# Patient Record
Sex: FEMALE
[De-identification: names, ages, dates, MRNs, and addresses within clinical notes are randomized per-mention and may not be internally consistent; named-entity substitution may affect disease eponyms.]

---

## 2020-11-17 ENCOUNTER — HOSPITAL ENCOUNTER (OUTPATIENT)
Dept: HOSPITAL 53 - M WUC | Age: 20
End: 2020-11-17
Attending: PHYSICIAN ASSISTANT
Payer: SELF-PAY

## 2020-11-17 DIAGNOSIS — R30.0: Primary | ICD-10-CM

## 2021-01-07 ENCOUNTER — HOSPITAL ENCOUNTER (EMERGENCY)
Dept: HOSPITAL 53 - M ED | Age: 21
Discharge: LEFT BEFORE DISCHARGE | End: 2021-01-07
Payer: SELF-PAY

## 2021-01-07 VITALS — WEIGHT: 155.32 LBS | BODY MASS INDEX: 24.38 KG/M2 | HEIGHT: 67 IN

## 2021-01-07 VITALS — DIASTOLIC BLOOD PRESSURE: 71 MMHG | SYSTOLIC BLOOD PRESSURE: 136 MMHG

## 2021-01-07 DIAGNOSIS — Z53.21: Primary | ICD-10-CM

## 2021-09-17 ENCOUNTER — HOSPITAL ENCOUNTER (OUTPATIENT)
Dept: HOSPITAL 53 - M RAD | Age: 21
End: 2021-09-17
Attending: ADVANCED PRACTICE MIDWIFE
Payer: COMMERCIAL

## 2021-09-17 DIAGNOSIS — Z34.92: Primary | ICD-10-CM

## 2021-10-07 ENCOUNTER — HOSPITAL ENCOUNTER (OUTPATIENT)
Dept: HOSPITAL 53 - M RAD | Age: 21
End: 2021-10-07
Attending: NURSE PRACTITIONER
Payer: COMMERCIAL

## 2021-10-07 DIAGNOSIS — Z36.89: Primary | ICD-10-CM

## 2021-10-07 DIAGNOSIS — Z3A.22: ICD-10-CM

## 2021-10-07 NOTE — REP
INDICATION:

PREG 22+ FETAL ANATOMY



COMPARISON:

09/17/2021



TECHNIQUE:

Transabdominal obstetrical ultrasound with color Doppler evaluation.



FINDINGS:

Examination demonstrates a single live intrauterine pregnancy in cephalic

presentation.  Fetal motion is identified by technologist.  Placenta is noted anterior

and  grade 0 without evidence for placenta previa or abruption.  Amniotic fluid volume

is normal.  Cervix measures 3.5 cm in length and appears closed..



Selected gestational age: 23 weeks 4 days with GABY 01/30/2022.

Gestational age by current measurements 23 weeks 1 day with GABY 02/02/2022.



FHR equals 150 beats per minute.



Estimated fetal weight 587 grams (47thpercentile).



Anatomical assessment demonstrates normal structures including facial profile,

four-chamber heart, diaphragm, stomach/abdominal wall, kidneys/bladder and spine.



IMPRESSION:

Single live intrauterine pregnancy in cephalic presentation demonstrating appropriate

estimated fetal weight.  In conjunction with prior examination anatomical assessment

is complete and normal.  No gross abnormalities are identified.





<Electronically signed by César Quiñones > 10/07/21 1967

## 2021-11-23 ENCOUNTER — HOSPITAL ENCOUNTER (EMERGENCY)
Dept: HOSPITAL 53 - M ED | Age: 21
Discharge: LEFT BEFORE DISCHARGE | End: 2021-11-23
Payer: COMMERCIAL

## 2021-11-23 VITALS
SYSTOLIC BLOOD PRESSURE: 109 MMHG | HEIGHT: 67 IN | BODY MASS INDEX: 25.36 KG/M2 | WEIGHT: 161.6 LBS | DIASTOLIC BLOOD PRESSURE: 62 MMHG

## 2021-11-23 DIAGNOSIS — Z53.21: Primary | ICD-10-CM

## 2022-01-25 ENCOUNTER — HOSPITAL ENCOUNTER (OUTPATIENT)
Dept: HOSPITAL 53 - M LDO | Age: 22
Discharge: HOME | End: 2022-01-25
Attending: OBSTETRICS & GYNECOLOGY
Payer: COMMERCIAL

## 2022-01-25 VITALS — BODY MASS INDEX: 27.44 KG/M2 | WEIGHT: 174.83 LBS | HEIGHT: 67 IN

## 2022-01-25 VITALS — DIASTOLIC BLOOD PRESSURE: 81 MMHG | SYSTOLIC BLOOD PRESSURE: 134 MMHG

## 2022-01-25 DIAGNOSIS — O36.8130: Primary | ICD-10-CM

## 2022-01-25 DIAGNOSIS — G43.909: ICD-10-CM

## 2022-01-25 DIAGNOSIS — O26.893: ICD-10-CM

## 2022-01-25 DIAGNOSIS — O99.353: ICD-10-CM

## 2022-01-25 DIAGNOSIS — Z3A.39: ICD-10-CM

## 2022-01-25 PROCEDURE — 84460 ALANINE AMINO (ALT) (SGPT): CPT

## 2022-01-25 PROCEDURE — 84156 ASSAY OF PROTEIN URINE: CPT

## 2022-01-25 PROCEDURE — 82247 BILIRUBIN TOTAL: CPT

## 2022-01-25 PROCEDURE — 59025 FETAL NON-STRESS TEST: CPT

## 2022-01-25 PROCEDURE — 85027 COMPLETE CBC AUTOMATED: CPT

## 2022-01-25 PROCEDURE — 82565 ASSAY OF CREATININE: CPT

## 2022-01-25 PROCEDURE — 84550 ASSAY OF BLOOD/URIC ACID: CPT

## 2022-01-25 PROCEDURE — 82570 ASSAY OF URINE CREATININE: CPT

## 2022-01-25 PROCEDURE — 84450 TRANSFERASE (AST) (SGOT): CPT

## 2022-01-25 PROCEDURE — 36415 COLL VENOUS BLD VENIPUNCTURE: CPT

## 2022-01-25 PROCEDURE — 83615 LACTATE (LD) (LDH) ENZYME: CPT

## 2022-01-28 ENCOUNTER — HOSPITAL ENCOUNTER (OUTPATIENT)
Dept: HOSPITAL 53 - M LDO | Age: 22
Discharge: HOME | End: 2022-01-28
Attending: STUDENT IN AN ORGANIZED HEALTH CARE EDUCATION/TRAINING PROGRAM
Payer: COMMERCIAL

## 2022-01-28 VITALS — BODY MASS INDEX: 27.54 KG/M2 | HEIGHT: 67 IN | WEIGHT: 175.49 LBS

## 2022-01-28 DIAGNOSIS — Z3A.39: ICD-10-CM

## 2022-01-28 DIAGNOSIS — O47.1: Primary | ICD-10-CM

## 2022-01-28 PROCEDURE — 59025 FETAL NON-STRESS TEST: CPT

## 2022-01-31 ENCOUNTER — HOSPITAL ENCOUNTER (OUTPATIENT)
Dept: HOSPITAL 53 - M LDO | Age: 22
Discharge: HOME | End: 2022-01-31
Attending: OBSTETRICS & GYNECOLOGY
Payer: COMMERCIAL

## 2022-01-31 VITALS — DIASTOLIC BLOOD PRESSURE: 73 MMHG | SYSTOLIC BLOOD PRESSURE: 113 MMHG

## 2022-01-31 VITALS — WEIGHT: 175.71 LBS | BODY MASS INDEX: 27.58 KG/M2 | HEIGHT: 67 IN

## 2022-01-31 DIAGNOSIS — O26.893: Primary | ICD-10-CM

## 2022-01-31 DIAGNOSIS — Z3A.40: ICD-10-CM

## 2022-01-31 DIAGNOSIS — N89.8: ICD-10-CM

## 2022-01-31 DIAGNOSIS — O48.0: ICD-10-CM

## 2022-01-31 PROCEDURE — 59025 FETAL NON-STRESS TEST: CPT

## 2022-01-31 PROCEDURE — 76815 OB US LIMITED FETUS(S): CPT

## 2022-02-01 ENCOUNTER — HOSPITAL ENCOUNTER (INPATIENT)
Dept: HOSPITAL 53 - M LDO | Age: 22
LOS: 2 days | Discharge: HOME | End: 2022-02-03
Attending: REGISTERED NURSE | Admitting: OBSTETRICS & GYNECOLOGY
Payer: COMMERCIAL

## 2022-02-01 VITALS — DIASTOLIC BLOOD PRESSURE: 60 MMHG | SYSTOLIC BLOOD PRESSURE: 133 MMHG

## 2022-02-01 VITALS — DIASTOLIC BLOOD PRESSURE: 52 MMHG | SYSTOLIC BLOOD PRESSURE: 104 MMHG

## 2022-02-01 VITALS — DIASTOLIC BLOOD PRESSURE: 66 MMHG | SYSTOLIC BLOOD PRESSURE: 123 MMHG

## 2022-02-01 VITALS — BODY MASS INDEX: 27.58 KG/M2 | HEIGHT: 67 IN | WEIGHT: 175.71 LBS

## 2022-02-01 VITALS — DIASTOLIC BLOOD PRESSURE: 51 MMHG | SYSTOLIC BLOOD PRESSURE: 101 MMHG

## 2022-02-01 VITALS — DIASTOLIC BLOOD PRESSURE: 62 MMHG | SYSTOLIC BLOOD PRESSURE: 127 MMHG

## 2022-02-01 VITALS — DIASTOLIC BLOOD PRESSURE: 70 MMHG | SYSTOLIC BLOOD PRESSURE: 134 MMHG

## 2022-02-01 VITALS — SYSTOLIC BLOOD PRESSURE: 131 MMHG | DIASTOLIC BLOOD PRESSURE: 60 MMHG

## 2022-02-01 VITALS — SYSTOLIC BLOOD PRESSURE: 130 MMHG | DIASTOLIC BLOOD PRESSURE: 60 MMHG

## 2022-02-01 VITALS — DIASTOLIC BLOOD PRESSURE: 55 MMHG | SYSTOLIC BLOOD PRESSURE: 111 MMHG

## 2022-02-01 VITALS — DIASTOLIC BLOOD PRESSURE: 60 MMHG | SYSTOLIC BLOOD PRESSURE: 128 MMHG

## 2022-02-01 VITALS — DIASTOLIC BLOOD PRESSURE: 68 MMHG | SYSTOLIC BLOOD PRESSURE: 127 MMHG

## 2022-02-01 VITALS — DIASTOLIC BLOOD PRESSURE: 70 MMHG | SYSTOLIC BLOOD PRESSURE: 142 MMHG

## 2022-02-01 VITALS — SYSTOLIC BLOOD PRESSURE: 111 MMHG | DIASTOLIC BLOOD PRESSURE: 55 MMHG

## 2022-02-01 VITALS — SYSTOLIC BLOOD PRESSURE: 118 MMHG | DIASTOLIC BLOOD PRESSURE: 58 MMHG

## 2022-02-01 VITALS — DIASTOLIC BLOOD PRESSURE: 61 MMHG | SYSTOLIC BLOOD PRESSURE: 129 MMHG

## 2022-02-01 VITALS — SYSTOLIC BLOOD PRESSURE: 124 MMHG | DIASTOLIC BLOOD PRESSURE: 68 MMHG

## 2022-02-01 VITALS — DIASTOLIC BLOOD PRESSURE: 78 MMHG | SYSTOLIC BLOOD PRESSURE: 144 MMHG

## 2022-02-01 VITALS — SYSTOLIC BLOOD PRESSURE: 135 MMHG | DIASTOLIC BLOOD PRESSURE: 64 MMHG

## 2022-02-01 VITALS — DIASTOLIC BLOOD PRESSURE: 82 MMHG | SYSTOLIC BLOOD PRESSURE: 135 MMHG

## 2022-02-01 VITALS — SYSTOLIC BLOOD PRESSURE: 120 MMHG | DIASTOLIC BLOOD PRESSURE: 58 MMHG

## 2022-02-01 VITALS — DIASTOLIC BLOOD PRESSURE: 65 MMHG | SYSTOLIC BLOOD PRESSURE: 137 MMHG

## 2022-02-01 VITALS — SYSTOLIC BLOOD PRESSURE: 131 MMHG | DIASTOLIC BLOOD PRESSURE: 70 MMHG

## 2022-02-01 VITALS — SYSTOLIC BLOOD PRESSURE: 120 MMHG | DIASTOLIC BLOOD PRESSURE: 71 MMHG

## 2022-02-01 VITALS — DIASTOLIC BLOOD PRESSURE: 70 MMHG | SYSTOLIC BLOOD PRESSURE: 144 MMHG

## 2022-02-01 VITALS — SYSTOLIC BLOOD PRESSURE: 116 MMHG | DIASTOLIC BLOOD PRESSURE: 88 MMHG

## 2022-02-01 VITALS — SYSTOLIC BLOOD PRESSURE: 149 MMHG | DIASTOLIC BLOOD PRESSURE: 74 MMHG

## 2022-02-01 VITALS — DIASTOLIC BLOOD PRESSURE: 57 MMHG | SYSTOLIC BLOOD PRESSURE: 106 MMHG

## 2022-02-01 VITALS — DIASTOLIC BLOOD PRESSURE: 85 MMHG | SYSTOLIC BLOOD PRESSURE: 144 MMHG

## 2022-02-01 VITALS — SYSTOLIC BLOOD PRESSURE: 148 MMHG | DIASTOLIC BLOOD PRESSURE: 85 MMHG

## 2022-02-01 VITALS — SYSTOLIC BLOOD PRESSURE: 138 MMHG | DIASTOLIC BLOOD PRESSURE: 59 MMHG

## 2022-02-01 VITALS — DIASTOLIC BLOOD PRESSURE: 53 MMHG | SYSTOLIC BLOOD PRESSURE: 106 MMHG

## 2022-02-01 VITALS — DIASTOLIC BLOOD PRESSURE: 87 MMHG | SYSTOLIC BLOOD PRESSURE: 149 MMHG

## 2022-02-01 VITALS — SYSTOLIC BLOOD PRESSURE: 110 MMHG | DIASTOLIC BLOOD PRESSURE: 59 MMHG

## 2022-02-01 VITALS — DIASTOLIC BLOOD PRESSURE: 62 MMHG | SYSTOLIC BLOOD PRESSURE: 129 MMHG

## 2022-02-01 VITALS — DIASTOLIC BLOOD PRESSURE: 58 MMHG | SYSTOLIC BLOOD PRESSURE: 113 MMHG

## 2022-02-01 VITALS — DIASTOLIC BLOOD PRESSURE: 64 MMHG | SYSTOLIC BLOOD PRESSURE: 117 MMHG

## 2022-02-01 VITALS — SYSTOLIC BLOOD PRESSURE: 116 MMHG | DIASTOLIC BLOOD PRESSURE: 56 MMHG

## 2022-02-01 DIAGNOSIS — F41.9: ICD-10-CM

## 2022-02-01 DIAGNOSIS — O32.6XX0: ICD-10-CM

## 2022-02-01 DIAGNOSIS — F32.A: ICD-10-CM

## 2022-02-01 DIAGNOSIS — D64.9: ICD-10-CM

## 2022-02-01 DIAGNOSIS — O48.0: Primary | ICD-10-CM

## 2022-02-01 DIAGNOSIS — Z3A.40: ICD-10-CM

## 2022-02-01 LAB
HCT VFR BLD AUTO: 32.1 % (ref 36–47)
HGB BLD-MCNC: 10.8 G/DL (ref 12–15.5)
MCH RBC QN AUTO: 28.3 PG (ref 27–33)
MCHC RBC AUTO-ENTMCNC: 33.6 G/DL (ref 32–36.5)
MCV RBC AUTO: 84 FL (ref 80–96)
PLATELET # BLD AUTO: 379 10^3/UL (ref 150–450)
RBC # BLD AUTO: 3.82 10^6/UL (ref 4–5.4)
WBC # BLD AUTO: 16.5 10^3/UL (ref 4–10)

## 2022-02-01 PROCEDURE — 0HQ9XZZ REPAIR PERINEUM SKIN, EXTERNAL APPROACH: ICD-10-PCS | Performed by: REGISTERED NURSE

## 2022-02-01 RX ADMIN — SODIUM CHLORIDE, POTASSIUM CHLORIDE, SODIUM LACTATE AND CALCIUM CHLORIDE SCH MLS/HR: 600; 310; 30; 20 INJECTION, SOLUTION INTRAVENOUS at 15:02

## 2022-02-01 RX ADMIN — SODIUM CHLORIDE, POTASSIUM CHLORIDE, SODIUM LACTATE AND CALCIUM CHLORIDE SCH MLS/HR: 600; 310; 30; 20 INJECTION, SOLUTION INTRAVENOUS at 08:50

## 2022-02-01 RX ADMIN — ACETAMINOPHEN PRN MG: 325 TABLET ORAL at 22:51

## 2022-02-01 RX ADMIN — IBUPROFEN PRN MG: 800 TABLET, FILM COATED ORAL at 19:32

## 2022-02-02 VITALS — SYSTOLIC BLOOD PRESSURE: 98 MMHG | DIASTOLIC BLOOD PRESSURE: 48 MMHG

## 2022-02-02 VITALS — SYSTOLIC BLOOD PRESSURE: 119 MMHG | DIASTOLIC BLOOD PRESSURE: 72 MMHG

## 2022-02-02 RX ADMIN — DOCUSATE SODIUM PRN MG: 100 CAPSULE, LIQUID FILLED ORAL at 21:45

## 2022-02-02 RX ADMIN — ACETAMINOPHEN PRN MG: 325 TABLET ORAL at 13:09

## 2022-02-02 RX ADMIN — Medication SCH TAB: at 07:32

## 2022-02-02 RX ADMIN — ACETAMINOPHEN PRN MG: 325 TABLET ORAL at 06:09

## 2022-02-02 RX ADMIN — IBUPROFEN PRN MG: 800 TABLET, FILM COATED ORAL at 18:06

## 2022-02-02 RX ADMIN — IBUPROFEN PRN MG: 800 TABLET, FILM COATED ORAL at 07:42

## 2022-02-03 VITALS — DIASTOLIC BLOOD PRESSURE: 55 MMHG | SYSTOLIC BLOOD PRESSURE: 113 MMHG

## 2022-02-03 RX ADMIN — DOCUSATE SODIUM PRN MG: 100 CAPSULE, LIQUID FILLED ORAL at 09:26

## 2022-02-03 RX ADMIN — IBUPROFEN PRN MG: 800 TABLET, FILM COATED ORAL at 09:26

## 2022-02-03 RX ADMIN — Medication SCH TAB: at 09:25

## 2022-07-19 ENCOUNTER — HOSPITAL ENCOUNTER (EMERGENCY)
Dept: HOSPITAL 53 - M ED | Age: 22
Discharge: HOME | End: 2022-07-19
Payer: COMMERCIAL

## 2022-07-19 VITALS — WEIGHT: 149.32 LBS | BODY MASS INDEX: 23.44 KG/M2 | HEIGHT: 67 IN

## 2022-07-19 VITALS — SYSTOLIC BLOOD PRESSURE: 109 MMHG | DIASTOLIC BLOOD PRESSURE: 58 MMHG

## 2022-07-19 DIAGNOSIS — O99.342: ICD-10-CM

## 2022-07-19 DIAGNOSIS — J06.9: ICD-10-CM

## 2022-07-19 DIAGNOSIS — O21.9: ICD-10-CM

## 2022-07-19 DIAGNOSIS — Z3A.16: ICD-10-CM

## 2022-07-19 DIAGNOSIS — O99.512: Primary | ICD-10-CM

## 2022-07-19 LAB
ALBUMIN SERPL BCG-MCNC: 3.3 GM/DL (ref 3.2–5.2)
ALT SERPL W P-5'-P-CCNC: 16 U/L (ref 12–78)
BASOPHILS # BLD AUTO: 0.1 10^3/UL (ref 0–0.2)
BASOPHILS NFR BLD AUTO: 0.4 % (ref 0–1)
BILIRUB CONJ SERPL-MCNC: < 0.1 MG/DL (ref 0–0.2)
BILIRUB SERPL-MCNC: 0.2 MG/DL (ref 0.2–1)
BUN SERPL-MCNC: 11 MG/DL (ref 7–18)
CALCIUM SERPL-MCNC: 8.9 MG/DL (ref 8.5–10.1)
CHLORIDE SERPL-SCNC: 111 MEQ/L (ref 98–107)
CO2 SERPL-SCNC: 20 MEQ/L (ref 21–32)
CREAT SERPL-MCNC: 0.45 MG/DL (ref 0.55–1.3)
EOSINOPHIL # BLD AUTO: 0.4 10^3/UL (ref 0–0.5)
EOSINOPHIL NFR BLD AUTO: 2.5 % (ref 0–3)
GFR SERPL CREATININE-BSD FRML MDRD: > 60 ML/MIN/{1.73_M2} (ref 60–?)
GLUCOSE SERPL-MCNC: 77 MG/DL (ref 70–100)
HCT VFR BLD AUTO: 29.7 % (ref 36–47)
HGB BLD-MCNC: 10 G/DL (ref 12–15.5)
LYMPHOCYTES # BLD AUTO: 2.4 10^3/UL (ref 1.5–5)
LYMPHOCYTES NFR BLD AUTO: 16.4 % (ref 24–44)
MCH RBC QN AUTO: 28.2 PG (ref 27–33)
MCHC RBC AUTO-ENTMCNC: 33.7 G/DL (ref 32–36.5)
MCV RBC AUTO: 83.7 FL (ref 80–96)
MONOCYTES # BLD AUTO: 0.8 10^3/UL (ref 0–0.8)
MONOCYTES NFR BLD AUTO: 5.6 % (ref 2–8)
NEUTROPHILS # BLD AUTO: 11 10^3/UL (ref 1.5–8.5)
NEUTROPHILS NFR BLD AUTO: 74.8 % (ref 36–66)
PLATELET # BLD AUTO: 312 10^3/UL (ref 150–450)
POTASSIUM SERPL-SCNC: 3.8 MEQ/L (ref 3.5–5.1)
PROT SERPL-MCNC: 6.4 GM/DL (ref 6.4–8.2)
RBC # BLD AUTO: 3.55 10^6/UL (ref 4–5.4)
RSV RNA NPH QL NAA+PROBE: NEGATIVE
SODIUM SERPL-SCNC: 140 MEQ/L (ref 136–145)
WBC # BLD AUTO: 14.7 10^3/UL (ref 4–10)

## 2022-07-19 PROCEDURE — 76815 OB US LIMITED FETUS(S): CPT

## 2022-07-19 PROCEDURE — 96361 HYDRATE IV INFUSION ADD-ON: CPT

## 2022-07-19 PROCEDURE — 99284 EMERGENCY DEPT VISIT MOD MDM: CPT

## 2022-07-19 PROCEDURE — 80048 BASIC METABOLIC PNL TOTAL CA: CPT

## 2022-07-19 PROCEDURE — 96374 THER/PROPH/DIAG INJ IV PUSH: CPT

## 2022-07-19 PROCEDURE — 85025 COMPLETE CBC W/AUTO DIFF WBC: CPT

## 2022-07-19 PROCEDURE — 80076 HEPATIC FUNCTION PANEL: CPT

## 2022-07-19 PROCEDURE — 87631 RESP VIRUS 3-5 TARGETS: CPT

## 2022-11-03 ENCOUNTER — HOSPITAL ENCOUNTER (OUTPATIENT)
Dept: HOSPITAL 53 - M LDO | Age: 22
Discharge: HOME | End: 2022-11-03
Attending: OBSTETRICS & GYNECOLOGY
Payer: COMMERCIAL

## 2022-11-03 VITALS — BODY MASS INDEX: 28.27 KG/M2 | HEIGHT: 67 IN | WEIGHT: 180.12 LBS

## 2022-11-03 VITALS — SYSTOLIC BLOOD PRESSURE: 108 MMHG | DIASTOLIC BLOOD PRESSURE: 59 MMHG

## 2022-11-03 VITALS — SYSTOLIC BLOOD PRESSURE: 116 MMHG | DIASTOLIC BLOOD PRESSURE: 60 MMHG

## 2022-11-03 DIAGNOSIS — Z3A.29: ICD-10-CM

## 2022-11-03 DIAGNOSIS — O23.43: Primary | ICD-10-CM

## 2022-11-03 LAB
APPEARANCE UR: CLEAR
BACTERIA URNS QL MICRO: (no result)
BILIRUB UR QL STRIP: NEGATIVE
COLOR UR: COLORLESS
GLUCOSE UR STRIP-MCNC: NEGATIVE MG/DL
HGB UR QL STRIP: NEGATIVE
HYALINE CASTS URNS QL MICRO: (no result) /LPF (ref 0–1)
KETONES UR QL STRIP: NEGATIVE MG/DL
LEUKOCYTE ESTERASE UR QL STRIP: POSITIVE
NITRITE UR QL STRIP: NEGATIVE
PH UR STRIP: 7 UNITS (ref 5–7)
PROT UR STRIP-MCNC: NEGATIVE MG/DL
RBC #/AREA URNS HPF: (no result) /HPF (ref 0–3)
SP GR UR STRIP: 1 (ref 1–1.03)
SQUAMOUS URNS QL MICRO: (no result) /HPF
UROBILINOGEN UR QL STRIP: NORMAL MG/DL
WBC #/AREA URNS HPF: (no result) /HPF (ref 0–3)

## 2022-11-03 PROCEDURE — 81000 URINALYSIS NONAUTO W/SCOPE: CPT

## 2022-11-03 PROCEDURE — 87086 URINE CULTURE/COLONY COUNT: CPT

## 2022-11-03 PROCEDURE — 59025 FETAL NON-STRESS TEST: CPT

## 2023-01-13 ENCOUNTER — HOSPITAL ENCOUNTER (INPATIENT)
Dept: HOSPITAL 53 - M LDO | Age: 23
LOS: 2 days | Discharge: HOME | End: 2023-01-15
Attending: STUDENT IN AN ORGANIZED HEALTH CARE EDUCATION/TRAINING PROGRAM | Admitting: GENERAL PRACTICE
Payer: COMMERCIAL

## 2023-01-13 VITALS — SYSTOLIC BLOOD PRESSURE: 121 MMHG | DIASTOLIC BLOOD PRESSURE: 70 MMHG

## 2023-01-13 VITALS — SYSTOLIC BLOOD PRESSURE: 114 MMHG | DIASTOLIC BLOOD PRESSURE: 58 MMHG

## 2023-01-13 VITALS — SYSTOLIC BLOOD PRESSURE: 123 MMHG | DIASTOLIC BLOOD PRESSURE: 58 MMHG

## 2023-01-13 VITALS — WEIGHT: 201.94 LBS | HEIGHT: 67 IN | BODY MASS INDEX: 31.7 KG/M2

## 2023-01-13 VITALS — DIASTOLIC BLOOD PRESSURE: 57 MMHG | SYSTOLIC BLOOD PRESSURE: 123 MMHG

## 2023-01-13 VITALS — DIASTOLIC BLOOD PRESSURE: 74 MMHG | SYSTOLIC BLOOD PRESSURE: 131 MMHG

## 2023-01-13 VITALS — DIASTOLIC BLOOD PRESSURE: 58 MMHG | SYSTOLIC BLOOD PRESSURE: 126 MMHG

## 2023-01-13 VITALS — SYSTOLIC BLOOD PRESSURE: 115 MMHG | DIASTOLIC BLOOD PRESSURE: 56 MMHG

## 2023-01-13 VITALS — SYSTOLIC BLOOD PRESSURE: 129 MMHG | DIASTOLIC BLOOD PRESSURE: 61 MMHG

## 2023-01-13 VITALS — SYSTOLIC BLOOD PRESSURE: 125 MMHG | DIASTOLIC BLOOD PRESSURE: 61 MMHG

## 2023-01-13 VITALS — DIASTOLIC BLOOD PRESSURE: 68 MMHG | SYSTOLIC BLOOD PRESSURE: 138 MMHG

## 2023-01-13 VITALS — DIASTOLIC BLOOD PRESSURE: 69 MMHG | SYSTOLIC BLOOD PRESSURE: 119 MMHG

## 2023-01-13 VITALS — DIASTOLIC BLOOD PRESSURE: 59 MMHG | SYSTOLIC BLOOD PRESSURE: 122 MMHG

## 2023-01-13 VITALS — SYSTOLIC BLOOD PRESSURE: 138 MMHG | DIASTOLIC BLOOD PRESSURE: 81 MMHG

## 2023-01-13 VITALS — DIASTOLIC BLOOD PRESSURE: 68 MMHG | SYSTOLIC BLOOD PRESSURE: 130 MMHG

## 2023-01-13 DIAGNOSIS — Z3A.39: ICD-10-CM

## 2023-01-13 LAB
BASE EXCESS BLDCOA CALC-SCNC: -6 MMOL/L
BASE EXCESS BLDCOV CALC-SCNC: -4.4 MMOL/L
CO2 BLDCOA CALC-SCNC: 24 MEQ/L
CO2 BLDCOV CALC-SCNC: 20.8 MEQ/L
HCO3 STD BLDCOA-SCNC: 18.9 MEQ/L
HCO3 STD BLDCOA-SCNC: 22.3 MEQ/L
HCO3 STD BLDCOV-SCNC: 19.8 MEQ/L
HCO3 STD BLDCOV-SCNC: 20.3 MEQ/L
HCT VFR BLD AUTO: 31.3 % (ref 36–47)
HGB BLD-MCNC: 10.2 G/DL (ref 12–15.5)
MCH RBC QN AUTO: 27.6 PG (ref 27–33)
MCHC RBC AUTO-ENTMCNC: 32.6 G/DL (ref 32–36.5)
MCV RBC AUTO: 84.6 FL (ref 80–96)
PCO2 BLDCOA: 54.1 MMHG
PCO2 BLDCOV: 34.5 MMHG
PH BLDCOA: 7.23 UNITS
PH BLDCOV: 7.38 UNITS
PLATELET # BLD AUTO: 391 10^3/UL (ref 150–450)
PO2 BLDCOA: (no result) MMHG
PO2 BLDCOV: 31 MMHG
RBC # BLD AUTO: 3.7 10^6/UL (ref 4–5.4)
SAO2 % BLDCOA: 66.8 %
SAO2 % BLDCOV: 74.6 %
WBC # BLD AUTO: 19.1 10^3/UL (ref 4–10)

## 2023-01-13 PROCEDURE — 0HQ9XZZ REPAIR PERINEUM SKIN, EXTERNAL APPROACH: ICD-10-PCS | Performed by: STUDENT IN AN ORGANIZED HEALTH CARE EDUCATION/TRAINING PROGRAM

## 2023-01-13 RX ADMIN — METHYLERGONOVINE MALEATE SCH MG: 0.2 TABLET ORAL at 23:59

## 2023-01-13 RX ADMIN — IBUPROFEN SCH MG: 800 TABLET, FILM COATED ORAL at 22:01

## 2023-01-13 RX ADMIN — ACETAMINOPHEN SCH MG: 500 TABLET ORAL at 17:19

## 2023-01-13 RX ADMIN — ACETAMINOPHEN SCH MG: 500 TABLET ORAL at 23:59

## 2023-01-14 VITALS — DIASTOLIC BLOOD PRESSURE: 61 MMHG | SYSTOLIC BLOOD PRESSURE: 116 MMHG

## 2023-01-14 VITALS — DIASTOLIC BLOOD PRESSURE: 55 MMHG | SYSTOLIC BLOOD PRESSURE: 105 MMHG

## 2023-01-14 LAB
HCT VFR BLD AUTO: 32.6 % (ref 36–47)
HGB BLD-MCNC: 10.4 G/DL (ref 12–15.5)
MCH RBC QN AUTO: 27.4 PG (ref 27–33)
MCHC RBC AUTO-ENTMCNC: 31.9 G/DL (ref 32–36.5)
MCV RBC AUTO: 85.8 FL (ref 80–96)
PLATELET # BLD AUTO: 372 10^3/UL (ref 150–450)
RBC # BLD AUTO: 3.8 10^6/UL (ref 4–5.4)
WBC # BLD AUTO: 19.8 10^3/UL (ref 4–10)

## 2023-01-14 RX ADMIN — IBUPROFEN SCH MG: 800 TABLET, FILM COATED ORAL at 05:58

## 2023-01-14 RX ADMIN — Medication SCH TAB: at 08:07

## 2023-01-14 RX ADMIN — FAMOTIDINE SCH MG: 20 TABLET, FILM COATED ORAL at 21:02

## 2023-01-14 RX ADMIN — ACETAMINOPHEN SCH MG: 500 TABLET ORAL at 05:03

## 2023-01-14 RX ADMIN — IBUPROFEN SCH MG: 800 TABLET, FILM COATED ORAL at 22:47

## 2023-01-14 RX ADMIN — METRONIDAZOLE SCH MG: 500 TABLET ORAL at 21:02

## 2023-01-14 RX ADMIN — METRONIDAZOLE SCH MG: 500 TABLET ORAL at 09:52

## 2023-01-14 RX ADMIN — ACETAMINOPHEN SCH MG: 500 TABLET ORAL at 22:47

## 2023-01-14 RX ADMIN — ACETAMINOPHEN SCH MG: 500 TABLET ORAL at 17:16

## 2023-01-14 RX ADMIN — METHYLERGONOVINE MALEATE SCH MG: 0.2 TABLET ORAL at 12:01

## 2023-01-14 RX ADMIN — IBUPROFEN SCH MG: 800 TABLET, FILM COATED ORAL at 15:01

## 2023-01-14 RX ADMIN — ACETAMINOPHEN SCH MG: 500 TABLET ORAL at 10:34

## 2023-01-14 RX ADMIN — FAMOTIDINE SCH MG: 20 TABLET, FILM COATED ORAL at 09:00

## 2023-01-14 RX ADMIN — METHYLERGONOVINE MALEATE SCH MG: 0.2 TABLET ORAL at 05:58

## 2023-01-15 VITALS — SYSTOLIC BLOOD PRESSURE: 103 MMHG | DIASTOLIC BLOOD PRESSURE: 53 MMHG

## 2023-01-15 RX ADMIN — METRONIDAZOLE SCH MG: 500 TABLET ORAL at 08:21

## 2023-01-15 RX ADMIN — ACETAMINOPHEN SCH MG: 500 TABLET ORAL at 04:55

## 2023-01-15 RX ADMIN — Medication SCH TAB: at 08:21

## 2023-01-15 RX ADMIN — IBUPROFEN SCH MG: 800 TABLET, FILM COATED ORAL at 05:51

## 2023-01-15 RX ADMIN — FAMOTIDINE SCH MG: 20 TABLET, FILM COATED ORAL at 08:21
